# Patient Record
Sex: FEMALE | Race: WHITE | NOT HISPANIC OR LATINO | URBAN - METROPOLITAN AREA
[De-identification: names, ages, dates, MRNs, and addresses within clinical notes are randomized per-mention and may not be internally consistent; named-entity substitution may affect disease eponyms.]

---

## 2020-07-09 ENCOUNTER — OFFICE VISIT (OUTPATIENT)
Dept: URBAN - METROPOLITAN AREA CLINIC 94 | Facility: CLINIC | Age: 35
End: 2020-07-09

## 2020-07-28 ENCOUNTER — LAB OUTSIDE AN ENCOUNTER (OUTPATIENT)
Dept: URBAN - METROPOLITAN AREA CLINIC 94 | Facility: CLINIC | Age: 35
End: 2020-07-28

## 2020-07-28 ENCOUNTER — OFFICE VISIT (OUTPATIENT)
Dept: URBAN - METROPOLITAN AREA CLINIC 94 | Facility: CLINIC | Age: 35
End: 2020-07-28
Payer: COMMERCIAL

## 2020-07-28 DIAGNOSIS — R19.7 DIARRHEA: ICD-10-CM

## 2020-07-28 DIAGNOSIS — R10.9 ABDOMINAL PAIN: ICD-10-CM

## 2020-07-28 PROCEDURE — G8427 DOCREV CUR MEDS BY ELIG CLIN: HCPCS | Performed by: INTERNAL MEDICINE

## 2020-07-28 PROCEDURE — G8417 CALC BMI ABV UP PARAM F/U: HCPCS | Performed by: INTERNAL MEDICINE

## 2020-07-28 PROCEDURE — G9903 PT SCRN TBCO ID AS NON USER: HCPCS | Performed by: INTERNAL MEDICINE

## 2020-07-28 PROCEDURE — 99204 OFFICE O/P NEW MOD 45 MIN: CPT | Performed by: INTERNAL MEDICINE

## 2020-07-28 RX ORDER — CHOLESTYRAMINE 4 G/9G
1 PACKET MIXED WITH WATER OR NON-CARBONATED DRINK POWDER, FOR SUSPENSION ORAL TWICE A DAY
Qty: 60 | Refills: 6 | OUTPATIENT

## 2020-07-28 RX ORDER — DIVALPROEX SODIUM 500 MG/1
1 TABLET TABLET, DELAYED RELEASE ORAL TWICE A DAY
Status: ACTIVE | COMMUNITY

## 2020-07-28 RX ORDER — BUPROPION HYDROCHLORIDE 300 MG/1
1 TABLET IN THE MORNING TABLET, EXTENDED RELEASE ORAL ONCE A DAY
Status: ACTIVE | COMMUNITY

## 2020-07-28 RX ORDER — HYDROXYZINE PAMOATE 50 MG/1
1 CAPSULE AS NEEDED CAPSULE ORAL
Status: ACTIVE | COMMUNITY

## 2020-07-28 RX ORDER — MELOXICAM 7.5 MG/1
1 TABLET TABLET ORAL ONCE A DAY
Status: ACTIVE | COMMUNITY

## 2020-07-28 RX ORDER — CLONAZEPAM 0.5 MG/1
1 TABLET AT BEDTIME TABLET ORAL ONCE A DAY
Status: ACTIVE | COMMUNITY

## 2020-07-28 RX ORDER — CHLORZOXAZONE 500 MG/1
1 TABLET AS NEEDED TABLET ORAL THREE TIMES A DAY
Status: ACTIVE | COMMUNITY

## 2020-07-28 RX ORDER — ZIPRASIDONE HYDROCHLORIDE 60 MG/1
1 CAPSULE WITH FOOD CAPSULE ORAL TWICE A DAY
Status: ACTIVE | COMMUNITY

## 2020-07-28 RX ORDER — SIMVASTATIN 20 MG/1
1 TABLET IN THE EVENING TABLET, FILM COATED ORAL ONCE A DAY
Status: ACTIVE | COMMUNITY

## 2020-07-28 RX ORDER — METFORMIN ER 500 MG 500 MG/1
1 TABLET WITH EVENING MEAL TABLET ORAL ONCE A DAY
Status: ACTIVE | COMMUNITY

## 2020-07-28 RX ORDER — ZIPRASIDONE HYDROCHLORIDE 80 MG/1
1 CAPSULE WITH FOOD CAPSULE ORAL TWICE A DAY
Status: ACTIVE | COMMUNITY

## 2020-07-28 RX ORDER — DICYCLOMINE HYDROCHLORIDE 10 MG/1
1 TABLET CAPSULE ORAL THREE TIMES A DAY
Qty: 90 TABLET | Refills: 3 | OUTPATIENT
Start: 2020-07-28 | End: 2020-11-24

## 2020-07-28 RX ORDER — BUDESONIDE AND FORMOTEROL FUMARATE DIHYDRATE 80; 4.5 UG/1; UG/1
2 PUFFS AEROSOL RESPIRATORY (INHALATION) ONCE A DAY
Status: ACTIVE | COMMUNITY

## 2020-07-28 NOTE — HPI-TODAY'S VISIT:
Pt states that she has chronic diarrhea with abdominal pain for several years. She states that she had an EGD and COLONOSCOPY in [facility] 5-6 years ago and reportedly " everything was OK". No old records available. Pt states that she has an average of 8-10 loose BMs daily. She has lower abdominal discomfort with bloating and cramping. Denies rectal bleeding, weight loss or fever. Pt recenlty saw her PCP. CT A/P and RUQ US  showed fatty liver otherwise negative. Stool studies were negative for any infection.

## 2020-07-31 LAB
A/G RATIO: 1.9
ACCA: 50
ALBUMIN: 4.2
ALCA: 16
ALKALINE PHOSPHATASE: 62
ALT (SGPT): 58
AMCA: 34
AST (SGOT): 32
ATYPICAL PANCA: NEGATIVE
BASO (ABSOLUTE): 0.1
BASOS: 1
BILIRUBIN, TOTAL: <0.2
BUN/CREATININE RATIO: 16
BUN: 14
C-REACTIVE PROTEIN, QUANT: 2
CALCIUM: 10.1
CARBON DIOXIDE, TOTAL: 22
CHLORIDE: 102
COMMENTS: (no result)
CREATININE: 0.85
EGFR IF AFRICN AM: 102
EGFR IF NONAFRICN AM: 88
ENDOMYSIAL ANTIBODY IGA: NEGATIVE
EOS (ABSOLUTE): 0.3
EOS: 3
GASCA: 12
GLOBULIN, TOTAL: 2.2
GLUCOSE: 125
HEMATOCRIT: 45.5
HEMATOLOGY COMMENTS:: (no result)
HEMOGLOBIN: 15.4
IMMATURE CELLS: (no result)
IMMATURE GRANS (ABS): 0
IMMATURE GRANULOCYTES: 0
IMMUNOGLOBULIN A, QN, SERUM: 198
LYMPHS (ABSOLUTE): 3.9
LYMPHS: 36
MCH: 30.9
MCHC: 33.8
MCV: 91
MONOCYTES(ABSOLUTE): 0.9
MONOCYTES: 9
NEUTROPHILS (ABSOLUTE): 5.5
NEUTROPHILS: 51
NRBC: (no result)
PLATELETS: 381
POTASSIUM: 4.3
PROTEIN, TOTAL: 6.4
RBC: 4.99
RDW: 13.3
SODIUM: 141
T-TRANSGLUTAMINASE (TTG) IGA: <2
TSH: 3.12
WBC: 10.8

## 2020-08-17 LAB
CALPROTECTIN, FECAL: 30
PANCREATIC ELASTASE, FECAL: >500

## 2020-08-27 ENCOUNTER — WEB ENCOUNTER (OUTPATIENT)
Dept: URBAN - METROPOLITAN AREA CLINIC 94 | Facility: CLINIC | Age: 35
End: 2020-08-27

## 2020-08-27 ENCOUNTER — OFFICE VISIT (OUTPATIENT)
Dept: URBAN - METROPOLITAN AREA CLINIC 94 | Facility: CLINIC | Age: 35
End: 2020-08-27
Payer: COMMERCIAL

## 2020-08-27 ENCOUNTER — LAB OUTSIDE AN ENCOUNTER (OUTPATIENT)
Dept: URBAN - METROPOLITAN AREA CLINIC 94 | Facility: CLINIC | Age: 35
End: 2020-08-27

## 2020-08-27 DIAGNOSIS — K58.9 IBS (IRRITABLE BOWEL SYNDROME)-DIARRHEA: ICD-10-CM

## 2020-08-27 DIAGNOSIS — E66.9 OBESITY: ICD-10-CM

## 2020-08-27 DIAGNOSIS — K76.0 FATTY LIVER: ICD-10-CM

## 2020-08-27 DIAGNOSIS — R11.2 NAUSEA & VOMITING: ICD-10-CM

## 2020-08-27 PROCEDURE — G8427 DOCREV CUR MEDS BY ELIG CLIN: HCPCS | Performed by: INTERNAL MEDICINE

## 2020-08-27 PROCEDURE — 99214 OFFICE O/P EST MOD 30 MIN: CPT | Performed by: INTERNAL MEDICINE

## 2020-08-27 PROCEDURE — G9903 PT SCRN TBCO ID AS NON USER: HCPCS | Performed by: INTERNAL MEDICINE

## 2020-08-27 PROCEDURE — G8417 CALC BMI ABV UP PARAM F/U: HCPCS | Performed by: INTERNAL MEDICINE

## 2020-08-27 RX ORDER — ZIPRASIDONE HYDROCHLORIDE 80 MG/1
1 CAPSULE WITH FOOD CAPSULE ORAL TWICE A DAY
Status: ACTIVE | COMMUNITY

## 2020-08-27 RX ORDER — CHOLESTYRAMINE 4 G/9G
1 PACKET MIXED WITH WATER OR NON-CARBONATED DRINK POWDER, FOR SUSPENSION ORAL TWICE A DAY
Qty: 60 | Refills: 6 | Status: ACTIVE | COMMUNITY

## 2020-08-27 RX ORDER — DIVALPROEX SODIUM 500 MG/1
1 TABLET TABLET, DELAYED RELEASE ORAL TWICE A DAY
Status: ACTIVE | COMMUNITY

## 2020-08-27 RX ORDER — CLONAZEPAM 0.5 MG/1
1 TABLET AT BEDTIME TABLET ORAL ONCE A DAY
Status: ACTIVE | COMMUNITY

## 2020-08-27 RX ORDER — METFORMIN ER 500 MG 500 MG/1
1 TABLET WITH EVENING MEAL TABLET ORAL ONCE A DAY
Status: ACTIVE | COMMUNITY

## 2020-08-27 RX ORDER — DICYCLOMINE HYDROCHLORIDE 10 MG/1
1 TABLET CAPSULE ORAL THREE TIMES A DAY
Qty: 90 TABLET | Refills: 3 | Status: ACTIVE | COMMUNITY
Start: 2020-07-28 | End: 2020-11-24

## 2020-08-27 RX ORDER — CHLORZOXAZONE 500 MG/1
1 TABLET AS NEEDED TABLET ORAL THREE TIMES A DAY
Status: ACTIVE | COMMUNITY

## 2020-08-27 RX ORDER — ZIPRASIDONE HYDROCHLORIDE 60 MG/1
1 CAPSULE WITH FOOD CAPSULE ORAL TWICE A DAY
Status: ACTIVE | COMMUNITY

## 2020-08-27 RX ORDER — SIMVASTATIN 20 MG/1
1 TABLET IN THE EVENING TABLET, FILM COATED ORAL ONCE A DAY
Status: ACTIVE | COMMUNITY

## 2020-08-27 RX ORDER — MELOXICAM 7.5 MG/1
1 TABLET TABLET ORAL ONCE A DAY
Status: ACTIVE | COMMUNITY

## 2020-08-27 RX ORDER — HYDROXYZINE PAMOATE 50 MG/1
1 CAPSULE AS NEEDED CAPSULE ORAL
Status: ACTIVE | COMMUNITY

## 2020-08-27 RX ORDER — BUPROPION HYDROCHLORIDE 300 MG/1
1 TABLET IN THE MORNING TABLET, EXTENDED RELEASE ORAL ONCE A DAY
Status: ACTIVE | COMMUNITY

## 2020-08-27 RX ORDER — BUDESONIDE AND FORMOTEROL FUMARATE DIHYDRATE 80; 4.5 UG/1; UG/1
2 PUFFS AEROSOL RESPIRATORY (INHALATION) ONCE A DAY
Status: ACTIVE | COMMUNITY

## 2020-08-27 NOTE — HPI-TODAY'S VISIT:
Pt states that diarrhea has resolved after starting questran and dicyclomine. She continues to have chronic nausea. She has diabetes and morbid obesity. She states that she had EGD and COLONOSCOPY 4-5 years ago in [facility] and does not remember results. Reports are not available. Pt reportedly had RUQ US and CT A/P by her PCP which reportedly showed fatty liver. Reports are not available. Pt recently has multiple labs including CBC, CMP, TSH, CRP, celiac serologies, IBD serologies, fecal elastase and fecal calprotectin. Only abnormlaity was slightly elevated ALT at 58. Remaining labs were WNL.

## 2020-11-30 ENCOUNTER — WEB ENCOUNTER (OUTPATIENT)
Dept: URBAN - METROPOLITAN AREA CLINIC 94 | Facility: CLINIC | Age: 35
End: 2020-11-30

## 2020-11-30 ENCOUNTER — CLAIMS CREATED FROM THE CLAIM WINDOW (OUTPATIENT)
Dept: URBAN - METROPOLITAN AREA CLINIC 94 | Facility: CLINIC | Age: 35
End: 2020-11-30
Payer: COMMERCIAL

## 2020-11-30 ENCOUNTER — OFFICE VISIT (OUTPATIENT)
Dept: URBAN - METROPOLITAN AREA CLINIC 94 | Facility: CLINIC | Age: 35
End: 2020-11-30

## 2020-11-30 DIAGNOSIS — K58.9 IBS (IRRITABLE BOWEL SYNDROME)-DIARRHEA: ICD-10-CM

## 2020-11-30 DIAGNOSIS — R11.2 NAUSEA & VOMITING: ICD-10-CM

## 2020-11-30 DIAGNOSIS — K76.0 FATTY LIVER: ICD-10-CM

## 2020-11-30 DIAGNOSIS — E66.9 OBESITY: ICD-10-CM

## 2020-11-30 DIAGNOSIS — K58.8 OTHER IRRITABLE BOWEL SYNDROME: ICD-10-CM

## 2020-11-30 PROCEDURE — G8484 FLU IMMUNIZE NO ADMIN: HCPCS | Performed by: PHYSICIAN ASSISTANT

## 2020-11-30 PROCEDURE — G9903 PT SCRN TBCO ID AS NON USER: HCPCS | Performed by: PHYSICIAN ASSISTANT

## 2020-11-30 PROCEDURE — G8427 DOCREV CUR MEDS BY ELIG CLIN: HCPCS | Performed by: PHYSICIAN ASSISTANT

## 2020-11-30 PROCEDURE — G8417 CALC BMI ABV UP PARAM F/U: HCPCS | Performed by: PHYSICIAN ASSISTANT

## 2020-11-30 PROCEDURE — 99213 OFFICE O/P EST LOW 20 MIN: CPT | Performed by: PHYSICIAN ASSISTANT

## 2020-11-30 PROCEDURE — 1036F TOBACCO NON-USER: CPT | Performed by: PHYSICIAN ASSISTANT

## 2020-11-30 RX ORDER — CHLORZOXAZONE 500 MG/1
1 TABLET AS NEEDED TABLET ORAL THREE TIMES A DAY
Status: ACTIVE | COMMUNITY

## 2020-11-30 RX ORDER — BUPROPION HYDROCHLORIDE 300 MG/1
1 TABLET IN THE MORNING TABLET, EXTENDED RELEASE ORAL ONCE A DAY
Status: ACTIVE | COMMUNITY

## 2020-11-30 RX ORDER — CLONAZEPAM 0.5 MG/1
1 TABLET AT BEDTIME TABLET ORAL ONCE A DAY
Status: ACTIVE | COMMUNITY

## 2020-11-30 RX ORDER — MELOXICAM 7.5 MG/1
1 TABLET TABLET ORAL ONCE A DAY
Status: ACTIVE | COMMUNITY

## 2020-11-30 RX ORDER — ONDANSETRON HYDROCHLORIDE 4 MG/1
1 TABLET TABLET, FILM COATED ORAL ONCE A DAY
Qty: 30 | OUTPATIENT
Start: 2020-11-30

## 2020-11-30 RX ORDER — ONDANSETRON HYDROCHLORIDE 4 MG/1
1 TABLET TABLET, FILM COATED ORAL ONCE A DAY
Qty: 30 TABLET | Refills: 2
Start: 2020-11-30

## 2020-11-30 RX ORDER — SIMVASTATIN 20 MG/1
1 TABLET IN THE EVENING TABLET, FILM COATED ORAL ONCE A DAY
Status: ACTIVE | COMMUNITY

## 2020-11-30 RX ORDER — DIVALPROEX SODIUM 500 MG/1
1 TABLET TABLET, DELAYED RELEASE ORAL TWICE A DAY
Status: DISCONTINUED | COMMUNITY

## 2020-11-30 RX ORDER — METFORMIN ER 500 MG 500 MG/1
1 TABLET WITH EVENING MEAL TABLET ORAL ONCE A DAY
Status: ACTIVE | COMMUNITY

## 2020-11-30 RX ORDER — CHOLESTYRAMINE 4 G/9G
1 PACKET MIXED WITH WATER OR NON-CARBONATED DRINK POWDER, FOR SUSPENSION ORAL TWICE A DAY
Qty: 60 | Refills: 6 | Status: ACTIVE | COMMUNITY

## 2020-11-30 RX ORDER — BUDESONIDE AND FORMOTEROL FUMARATE DIHYDRATE 80; 4.5 UG/1; UG/1
2 PUFFS AEROSOL RESPIRATORY (INHALATION) ONCE A DAY
Status: ACTIVE | COMMUNITY

## 2020-11-30 RX ORDER — ZIPRASIDONE HYDROCHLORIDE 60 MG/1
1 CAPSULE WITH FOOD CAPSULE ORAL TWICE A DAY
Status: ACTIVE | COMMUNITY

## 2020-11-30 RX ORDER — CHOLESTYRAMINE 4 G/9G
1 PACKET MIXED WITH WATER OR NON-CARBONATED DRINK POWDER, FOR SUSPENSION ORAL TWICE A DAY
Qty: 60

## 2020-11-30 RX ORDER — HYDROXYZINE PAMOATE 50 MG/1
1 CAPSULE AS NEEDED CAPSULE ORAL
Status: ACTIVE | COMMUNITY

## 2020-11-30 RX ORDER — DICYCLOMINE HYDROCHLORIDE 10 MG/1
1 CAPSULES CAPSULE ORAL THREE TIMES A DAY
Qty: 270 CAPSULE | Refills: 1 | OUTPATIENT
Start: 2020-11-30 | End: 2021-05-29

## 2020-11-30 RX ORDER — CHOLESTYRAMINE 4 G/9G
1 PACKET MIXED WITH WATER OR NON-CARBONATED DRINK POWDER, FOR SUSPENSION ORAL TWICE A DAY
Qty: 180 | Refills: 1

## 2020-11-30 RX ORDER — ZIPRASIDONE HYDROCHLORIDE 80 MG/1
1 CAPSULE WITH FOOD CAPSULE ORAL TWICE A DAY
Status: ACTIVE | COMMUNITY

## 2020-11-30 RX ORDER — DICYCLOMINE HYDROCHLORIDE 10 MG/1
2 CAPSULES CAPSULE ORAL THREE TIMES A DAY
Qty: 180 | OUTPATIENT
Start: 2020-11-30 | End: 2020-12-30

## 2020-11-30 NOTE — HPI-TODAY'S VISIT:
The patient returns today and reports chronic nausea. She states that this occurs when she is hungry or if she eats a large meal. She eats two meals/day with one snack. She reports occasional vomiting. She takes Zofran ~ twice/week.  She takes Questran as needed for diarrhea and Dicyclomine prn for abd pain.   She has diabetes and morbid obesity. Gastric Emptying study was negative.  She states that she had EGD and COLONOSCOPY 4-5 years ago in [facility] and does not remember results. Reports are not available. Pt reportedly had RUQ US and CT A/P by her PCP which reportedly showed fatty liver. Reports are not available. Pt recently has multiple labs including CBC, CMP, TSH, CRP, celiac serologies, IBD serologies, fecal elastase and fecal calprotectin. Only abnormlaity was slightly elevated ALT at 58. Remaining labs were WNL.

## 2021-03-01 ENCOUNTER — OFFICE VISIT (OUTPATIENT)
Dept: URBAN - METROPOLITAN AREA CLINIC 94 | Facility: CLINIC | Age: 36
End: 2021-03-01

## 2021-03-15 ENCOUNTER — OFFICE VISIT (OUTPATIENT)
Dept: URBAN - METROPOLITAN AREA CLINIC 94 | Facility: CLINIC | Age: 36
End: 2021-03-15
Payer: COMMERCIAL

## 2021-03-15 DIAGNOSIS — K21.9 GASTROESOPHAGEAL REFLUX DISEASE, UNSPECIFIED WHETHER ESOPHAGITIS PRESENT: ICD-10-CM

## 2021-03-15 DIAGNOSIS — K58.9 IRRITABLE BOWEL SYNDROME, UNSPECIFIED TYPE: ICD-10-CM

## 2021-03-15 DIAGNOSIS — K76.0 FATTY (CHANGE OF) LIVER, NOT ELSEWHERE CLASSIFIED: ICD-10-CM

## 2021-03-15 PROCEDURE — 99213 OFFICE O/P EST LOW 20 MIN: CPT | Performed by: INTERNAL MEDICINE

## 2021-03-15 RX ORDER — ONDANSETRON HYDROCHLORIDE 4 MG/1
1 TABLET TABLET, FILM COATED ORAL ONCE A DAY
Qty: 30 | Status: ACTIVE | COMMUNITY
Start: 2020-11-30

## 2021-03-15 RX ORDER — ZIPRASIDONE HYDROCHLORIDE 60 MG/1
2 CAPSULES CAPSULE ORAL AT BEDTIME
Status: ACTIVE | COMMUNITY

## 2021-03-15 RX ORDER — CHLORZOXAZONE 500 MG/1
1 TABLET AS NEEDED TABLET ORAL THREE TIMES A DAY
Status: ACTIVE | COMMUNITY

## 2021-03-15 RX ORDER — ZIPRASIDONE HYDROCHLORIDE 80 MG/1
1 CAPSULE WITH FOOD CAPSULE ORAL DAILY
Status: ACTIVE | COMMUNITY

## 2021-03-15 RX ORDER — METFORMIN ER 500 MG 500 MG/1
1 TABLET WITH EVENING MEAL TABLET ORAL ONCE A DAY
Status: ACTIVE | COMMUNITY

## 2021-03-15 RX ORDER — DICYCLOMINE HYDROCHLORIDE 10 MG/1
1 CAPSULES CAPSULE ORAL THREE TIMES A DAY
Qty: 270 CAPSULE | Refills: 1 | Status: ACTIVE | COMMUNITY
Start: 2020-11-30 | End: 2021-05-29

## 2021-03-15 RX ORDER — CHOLESTYRAMINE 4 G/9G
1 PACKET MIXED WITH WATER OR NON-CARBONATED DRINK POWDER, FOR SUSPENSION ORAL TWICE A DAY
Qty: 60 | Status: ACTIVE | COMMUNITY

## 2021-03-15 RX ORDER — ZIPRASIDONE HYDROCHLORIDE 80 MG/1
1 CAPSULE WITH FOOD CAPSULE ORAL TWICE A DAY
Status: DISCONTINUED | COMMUNITY

## 2021-03-15 RX ORDER — TRAMADOL HYDROCHLORIDE 50 MG/1
1 TABLET AS NEEDED TABLET, FILM COATED ORAL ONCE A DAY
Status: ACTIVE | COMMUNITY

## 2021-03-15 RX ORDER — HYDROXYZINE PAMOATE 50 MG/1
1 CAPSULE AS NEEDED CAPSULE ORAL
Status: ACTIVE | COMMUNITY

## 2021-03-15 RX ORDER — BUPROPION HYDROCHLORIDE 300 MG/1
1 TABLET IN THE MORNING TABLET, EXTENDED RELEASE ORAL ONCE A DAY
Status: ACTIVE | COMMUNITY

## 2021-03-15 RX ORDER — MELOXICAM 7.5 MG/1
1 TABLET TABLET ORAL ONCE A DAY
Status: ACTIVE | COMMUNITY

## 2021-03-15 RX ORDER — SIMVASTATIN 20 MG/1
1 TABLET IN THE EVENING TABLET, FILM COATED ORAL ONCE A DAY
Status: ACTIVE | COMMUNITY

## 2021-03-15 RX ORDER — CLONAZEPAM 0.5 MG/1
1 TABLET AT BEDTIME TABLET ORAL ONCE A DAY
Status: ACTIVE | COMMUNITY

## 2021-03-15 RX ORDER — BUDESONIDE AND FORMOTEROL FUMARATE DIHYDRATE 80; 4.5 UG/1; UG/1
2 PUFFS AEROSOL RESPIRATORY (INHALATION) ONCE A DAY
Status: ACTIVE | COMMUNITY

## 2021-03-15 NOTE — HPI-TODAY'S VISIT:
The patient returns today and reports improvement in  nausea and diarrhea .   She takes Questran as needed for diarrhea and Dicyclomine prn for abd pain. She has diabetes and morbid obesity. Gastric Emptying study was negative.  She states that she had EGD and COLONOSCOPY 4-5 years ago in [facility] and does not remember results. Reports are not available. Pt reportedly had RUQ US and CT A/P by her PCP which reportedly showed fatty liver. Reports are not available. Pt recently has multiple labs including CBC, CMP, TSH, CRP, celiac serologies, IBD serologies, fecal elastase and fecal calprotectin. Only abnormlaity was slightly elevated ALT at 58. Remaining labs were WNL.

## 2021-05-20 ENCOUNTER — OFFICE VISIT (OUTPATIENT)
Dept: URBAN - METROPOLITAN AREA CLINIC 40 | Facility: CLINIC | Age: 36
End: 2021-05-20
Payer: COMMERCIAL

## 2021-05-20 VITALS
BODY MASS INDEX: 48.82 KG/M2 | HEART RATE: 101 BPM | TEMPERATURE: 97.5 F | HEIGHT: 65 IN | WEIGHT: 293 LBS | DIASTOLIC BLOOD PRESSURE: 82 MMHG | SYSTOLIC BLOOD PRESSURE: 140 MMHG

## 2021-05-20 DIAGNOSIS — K76.0 FATTY (CHANGE OF) LIVER, NOT ELSEWHERE CLASSIFIED: ICD-10-CM

## 2021-05-20 DIAGNOSIS — E66.9 OBESITY (BMI 30-39.9): ICD-10-CM

## 2021-05-20 DIAGNOSIS — K21.9 GERD: ICD-10-CM

## 2021-05-20 DIAGNOSIS — D72.829 LEUKOCYTOSIS: ICD-10-CM

## 2021-05-20 DIAGNOSIS — R79.89 ELEVATED LFTS: ICD-10-CM

## 2021-05-20 DIAGNOSIS — K58.9 IBS (IRRITABLE BOWEL SYNDROME) DIARRHEA PREDOMINANT: ICD-10-CM

## 2021-05-20 PROBLEM — 10743008: Status: ACTIVE | Noted: 2021-03-15

## 2021-05-20 PROCEDURE — 99213 OFFICE O/P EST LOW 20 MIN: CPT | Performed by: INTERNAL MEDICINE

## 2021-05-20 RX ORDER — ZIPRASIDONE HYDROCHLORIDE 60 MG/1
2 CAPSULES CAPSULE ORAL AT BEDTIME
Status: ACTIVE | COMMUNITY

## 2021-05-20 RX ORDER — HYDROXYZINE PAMOATE 50 MG/1
1 CAPSULE AS NEEDED CAPSULE ORAL
Status: ACTIVE | COMMUNITY

## 2021-05-20 RX ORDER — BUPROPION HYDROCHLORIDE 300 MG/1
1 TABLET IN THE MORNING TABLET, EXTENDED RELEASE ORAL ONCE A DAY
Status: ACTIVE | COMMUNITY

## 2021-05-20 RX ORDER — ONDANSETRON HYDROCHLORIDE 4 MG/1
1 TABLET TABLET, FILM COATED ORAL ONCE A DAY
Qty: 30 | Status: ACTIVE | COMMUNITY
Start: 2020-11-30

## 2021-05-20 RX ORDER — TRAMADOL HYDROCHLORIDE 50 MG/1
1 TABLET AS NEEDED TABLET, FILM COATED ORAL ONCE A DAY
Status: ACTIVE | COMMUNITY

## 2021-05-20 RX ORDER — CLONAZEPAM 0.5 MG/1
1 TABLET AT BEDTIME TABLET ORAL ONCE A DAY
Status: ACTIVE | COMMUNITY

## 2021-05-20 RX ORDER — METFORMIN ER 500 MG 500 MG/1
1 TABLET WITH EVENING MEAL TABLET ORAL ONCE A DAY
Status: ACTIVE | COMMUNITY

## 2021-05-20 RX ORDER — CHLORZOXAZONE 500 MG/1
1 TABLET AS NEEDED TABLET ORAL THREE TIMES A DAY
Status: ACTIVE | COMMUNITY

## 2021-05-20 RX ORDER — EMPAGLIFLOZIN 25 MG/1
1 TABLET TABLET, FILM COATED ORAL ONCE A DAY
Status: ACTIVE | COMMUNITY

## 2021-05-20 RX ORDER — DICYCLOMINE HYDROCHLORIDE 10 MG/1
1 CAPSULES CAPSULE ORAL THREE TIMES A DAY
Qty: 270 CAPSULE | Refills: 1 | Status: ACTIVE | COMMUNITY
Start: 2020-11-30 | End: 2021-05-29

## 2021-05-20 RX ORDER — ZIPRASIDONE HYDROCHLORIDE 80 MG/1
1 CAPSULE WITH FOOD CAPSULE ORAL DAILY
Status: ACTIVE | COMMUNITY

## 2021-05-20 RX ORDER — BUDESONIDE AND FORMOTEROL FUMARATE DIHYDRATE 80; 4.5 UG/1; UG/1
2 PUFFS AEROSOL RESPIRATORY (INHALATION) ONCE A DAY
Status: ACTIVE | COMMUNITY

## 2021-05-20 RX ORDER — CHOLESTYRAMINE 4 G/9G
1 PACKET MIXED WITH WATER OR NON-CARBONATED DRINK POWDER, FOR SUSPENSION ORAL TWICE A DAY
Qty: 60 | Status: ACTIVE | COMMUNITY

## 2021-05-20 RX ORDER — SIMVASTATIN 20 MG/1
1 TABLET IN THE EVENING TABLET, FILM COATED ORAL ONCE A DAY
Status: ACTIVE | COMMUNITY

## 2021-05-20 RX ORDER — LOSARTAN POTASSIUM 25 MG/1
1 TABLET TABLET ORAL ONCE A DAY
Status: ACTIVE | COMMUNITY

## 2021-05-20 RX ORDER — MELOXICAM 7.5 MG/1
1 TABLET TABLET ORAL ONCE A DAY
Status: ACTIVE | COMMUNITY

## 2021-05-20 NOTE — PHYSICAL EXAM CONSTITUTIONAL:
well developed, morbidly obese , in no acute distress , ambulating without difficulty , normal communication ability

## 2021-05-20 NOTE — HPI-TODAY'S VISIT:
Ms. Irving is a 35 year old White female who presents to [place] office for evaluation of abnormal liver tests. Last seen by Dr. Thapa 3/15/21 for f/u of nausea and diarrhea .   She takes Questran as needed for diarrhea and Dicyclomine prn for abd pain. She has diabetes and morbid obesity. Gastric Emptying study was negative.  She states that she had EGD and COLONOSCOPY 4-5 years ago in [facility] and does not remember results. Reports are not available. Pt reportedly had RUQ US and CT A/P by her PCP which reportedly showed fatty liver. Reports are not available. Pt recently has multiple labs including CBC, CMP, TSH, CRP, celiac serologies, IBD serologies, fecal elastase and fecal calprotectin. Only abnormality was slightly elevated ALT at 58. Remaining labs were WNL. Chronic pain, anxiety. Diabetic. Has HLD.  Given both Bentyl and colestipol for IBS-D. She did have a right upper quadrant ultrasound on May 4, 2021 for evaluation of right upper quadrant abdominal pain.  The exam was normal.  Gallbladder was normal without stones or wall thickness.  Patient was not tender over the region of the gallbladder.  Once again, findings of fatty liver.  No focal lesion identified.  Right kidney normal as well as common bile duct and visualized portions of the pancreas though limited due to her body habitus.  According to blood work from April 27, 2021, AST 37 and ALT decreased from prior labs in December down to 54 from 75.  Her alkaline phosphatase and total bilirubin have remained normal.  Triglycerides elevated at 391 with normal total cholesterol and LDL.  Around that time, WBC elevated 12.59 with hemoglobin hematocrit elevated and platelet count of 451, likely acute phase reactant.  Hemoglobin A1c elevated at 7.2%.  Hepatitis C antibody negative. Since been diagnosed as diabetic, she has completely revamped her diet and is eating low-carb, no refined sugars or starches.  Drinking significant amounts of water.  Low physical activity due to recent back surgery.  Denies nausea, vomiting or dysphagia.  Her episodes of nausea have improved greatly.  She does follow-up with a pain specialist for pain control.  Believes that due to her change in her diet her IBS symptoms have also improved, not requiring daily colestipol nor Bentyl.  She is using a diabetic educator and believes this is helping her significantly, reporting a 50 pound weight loss in the last 4 months.  According to our scale, since her last visit in March, she has lost 18 pounds.  Good appetite.  No new GI complaints.

## 2021-09-12 LAB
HBSAG SCREEN: NEGATIVE
HEP A AB, IGM: NEGATIVE
HEP A AB, TOTAL: NEGATIVE
HEP B CORE AB, IGM: NEGATIVE
HEP B CORE AB, TOT: NEGATIVE
HEP B SURFACE AB, QUAL: NON REACTIVE
HEP BE AB: NEGATIVE
HEP BE AG: NEGATIVE

## 2021-09-15 ENCOUNTER — OFFICE VISIT (OUTPATIENT)
Dept: URBAN - METROPOLITAN AREA CLINIC 40 | Facility: CLINIC | Age: 36
End: 2021-09-15
Payer: COMMERCIAL

## 2021-09-15 ENCOUNTER — WEB ENCOUNTER (OUTPATIENT)
Dept: URBAN - METROPOLITAN AREA CLINIC 40 | Facility: CLINIC | Age: 36
End: 2021-09-15

## 2021-09-15 VITALS
TEMPERATURE: 97.6 F | HEART RATE: 84 BPM | DIASTOLIC BLOOD PRESSURE: 74 MMHG | HEIGHT: 65 IN | WEIGHT: 293 LBS | BODY MASS INDEX: 48.82 KG/M2 | SYSTOLIC BLOOD PRESSURE: 126 MMHG

## 2021-09-15 DIAGNOSIS — K58.9 IBS (IRRITABLE BOWEL SYNDROME) DIARRHEA PREDOMINANT: ICD-10-CM

## 2021-09-15 DIAGNOSIS — E66.9 OBESITY (BMI 30-39.9): ICD-10-CM

## 2021-09-15 DIAGNOSIS — R79.89 ELEVATED LFTS: ICD-10-CM

## 2021-09-15 DIAGNOSIS — K76.0 FATTY (CHANGE OF) LIVER, NOT ELSEWHERE CLASSIFIED: ICD-10-CM

## 2021-09-15 DIAGNOSIS — K21.9 GERD: ICD-10-CM

## 2021-09-15 DIAGNOSIS — D72.829 LEUKOCYTOSIS: ICD-10-CM

## 2021-09-15 PROCEDURE — 99213 OFFICE O/P EST LOW 20 MIN: CPT | Performed by: PHYSICIAN ASSISTANT

## 2021-09-15 RX ORDER — HYDROXYZINE PAMOATE 50 MG/1
1 CAPSULE AS NEEDED CAPSULE ORAL
Status: ACTIVE | COMMUNITY

## 2021-09-15 RX ORDER — CHLORZOXAZONE 500 MG/1
1 TABLET AS NEEDED TABLET ORAL THREE TIMES A DAY
Status: ACTIVE | COMMUNITY

## 2021-09-15 RX ORDER — SIMVASTATIN 20 MG/1
1 TABLET IN THE EVENING TABLET, FILM COATED ORAL ONCE A DAY
Status: ACTIVE | COMMUNITY

## 2021-09-15 RX ORDER — TRAMADOL HYDROCHLORIDE 50 MG/1
1 TABLET AS NEEDED TABLET, FILM COATED ORAL ONCE A DAY
Status: ACTIVE | COMMUNITY

## 2021-09-15 RX ORDER — EMPAGLIFLOZIN 25 MG/1
1 TABLET TABLET, FILM COATED ORAL ONCE A DAY
Status: ACTIVE | COMMUNITY

## 2021-09-15 RX ORDER — LOSARTAN POTASSIUM 25 MG/1
1 TABLET TABLET ORAL ONCE A DAY
Status: ACTIVE | COMMUNITY

## 2021-09-15 RX ORDER — CHOLESTYRAMINE 4 G/9G
1 PACKET MIXED WITH WATER OR NON-CARBONATED DRINK POWDER, FOR SUSPENSION ORAL TWICE A DAY
Qty: 60 | Status: ACTIVE | COMMUNITY

## 2021-09-15 RX ORDER — MELOXICAM 7.5 MG/1
1 TABLET TABLET ORAL ONCE A DAY
Status: ACTIVE | COMMUNITY

## 2021-09-15 RX ORDER — CLONAZEPAM 0.5 MG/1
1 TABLET AT BEDTIME TABLET ORAL ONCE A DAY
Status: ACTIVE | COMMUNITY

## 2021-09-15 RX ORDER — BUPROPION HYDROCHLORIDE 300 MG/1
1 TABLET IN THE MORNING TABLET, EXTENDED RELEASE ORAL ONCE A DAY
Status: ACTIVE | COMMUNITY

## 2021-09-15 RX ORDER — ZIPRASIDONE HYDROCHLORIDE 80 MG/1
1 CAPSULE WITH FOOD CAPSULE ORAL DAILY
Status: ACTIVE | COMMUNITY

## 2021-09-15 RX ORDER — ZIPRASIDONE HYDROCHLORIDE 60 MG/1
2 CAPSULES CAPSULE ORAL AT BEDTIME
Status: ACTIVE | COMMUNITY

## 2021-09-15 RX ORDER — ONDANSETRON HYDROCHLORIDE 4 MG/1
1 TABLET TABLET, FILM COATED ORAL ONCE A DAY
Qty: 30 | Status: ACTIVE | COMMUNITY
Start: 2020-11-30

## 2021-09-15 RX ORDER — TRAZODONE HYDROCHLORIDE 100 MG/1
1 TABLET AT BEDTIME TABLET, FILM COATED ORAL ONCE A DAY
Status: ACTIVE | COMMUNITY

## 2021-09-15 RX ORDER — METFORMIN ER 500 MG 500 MG/1
1 TABLET WITH EVENING MEAL TABLET ORAL ONCE A DAY
Status: ACTIVE | COMMUNITY

## 2021-09-15 RX ORDER — BUDESONIDE AND FORMOTEROL FUMARATE DIHYDRATE 80; 4.5 UG/1; UG/1
2 PUFFS AEROSOL RESPIRATORY (INHALATION) ONCE A DAY
Status: ACTIVE | COMMUNITY

## 2021-09-15 NOTE — HPI-TODAY'S VISIT:
Ms. Irving is a 36 year old White female who presents to [place] office for evaluation of abnormal liver tests, last visit 5/21/21. Previously seen by Dr. Thapa 3/15/21 for f/u of nausea and diarrhea .   She takes Questran as needed for diarrhea and Dicyclomine prn for abd pain. She has diabetes and morbid obesity. Gastric Emptying study was negative.  She states that she had EGD and COLONOSCOPY 4-5 years ago in [facility] and does not remember results. Reports are not available. Pt reportedly had RUQ US and CT A/P by her PCP which reportedly showed fatty liver. Reports are not available. Pt recently has multiple labs including CBC, CMP, TSH, CRP, celiac serologies, IBD serologies, fecal elastase and fecal calprotectin. Only abnormality was slightly elevated ALT at 58. Remaining labs were WNL. Chronic pain, anxiety. Diabetic. Has HLD.  Given both Bentyl and colestipol for IBS-D. She did have a right upper quadrant ultrasound on May 4, 2021 for evaluation of right upper quadrant abdominal pain.  The exam was normal.  Gallbladder was normal without stones or wall thickness.  Patient was not tender over the region of the gallbladder.  Once again, findings of fatty liver.  No focal lesion identified.  Right kidney normal as well as common bile duct and visualized portions of the pancreas though limited due to her body habitus.  According to blood work from April 27, 2021, AST 37 and ALT decreased from prior labs in December down to 54 from 75.  Her alkaline phosphatase and total bilirubin have remained normal.  Triglycerides elevated at 391 with normal total cholesterol and LDL.  Around that time, WBC elevated 12.59 with hemoglobin hematocrit elevated and platelet count of 451, likely acute phase reactant.  Hemoglobin A1c elevated at 7.2%.  Hepatitis C antibody negative. Since been diagnosed as diabetic, she has completely revamped her diet and is eating low-carb, no refined sugars or starches.  Drinking significant amounts of water.  Low physical activity due to recent back surgery.  Denies nausea, vomiting or dysphagia.  Her episodes of nausea have improved greatly.  She does follow-up with a pain specialist for pain control.  Believes that due to her change in her diet her IBS symptoms have also improved, not requiring neither daily colestipol nor Bentyl.  She is using a diabetic educator and believes this is helping her significantly, reporting a 50 pound weight loss in the last 4 months.  Good appetite.  No new GI complaints. Recent lab testing with negative HAV and HBV. Down >45 pounds since last visit.

## 2021-09-16 LAB
ALBUMIN: 4.3
ALKALINE PHOSPHATASE: 62
ALT (SGPT): 38
AST (SGOT): 26
BILIRUBIN, DIRECT: 0.11
BILIRUBIN, TOTAL: 0.3
PROTEIN, TOTAL: 6.6

## 2021-09-23 ENCOUNTER — OFFICE VISIT (OUTPATIENT)
Dept: URBAN - METROPOLITAN AREA CLINIC 40 | Facility: CLINIC | Age: 36
End: 2021-09-23

## 2022-01-11 ENCOUNTER — OFFICE VISIT (OUTPATIENT)
Dept: URBAN - METROPOLITAN AREA CLINIC 40 | Facility: CLINIC | Age: 37
End: 2022-01-11
Payer: COMMERCIAL

## 2022-01-11 VITALS — BODY MASS INDEX: 47.65 KG/M2 | HEIGHT: 65 IN | WEIGHT: 286 LBS

## 2022-01-11 DIAGNOSIS — K21.9 GERD: ICD-10-CM

## 2022-01-11 DIAGNOSIS — R79.89 ELEVATED LFTS: ICD-10-CM

## 2022-01-11 DIAGNOSIS — E66.9 OBESITY (BMI 30-39.9): ICD-10-CM

## 2022-01-11 DIAGNOSIS — R11.2 NAUSEA & VOMITING: ICD-10-CM

## 2022-01-11 DIAGNOSIS — K58.9 IBS (IRRITABLE BOWEL SYNDROME) DIARRHEA PREDOMINANT: ICD-10-CM

## 2022-01-11 DIAGNOSIS — R10.9 ABDOMINAL CRAMPING: ICD-10-CM

## 2022-01-11 DIAGNOSIS — K76.0 FATTY (CHANGE OF) LIVER, NOT ELSEWHERE CLASSIFIED: ICD-10-CM

## 2022-01-11 PROBLEM — 111583006 LEUKOCYTOSIS: Status: ACTIVE | Noted: 2021-05-20

## 2022-01-11 PROCEDURE — 99213 OFFICE O/P EST LOW 20 MIN: CPT | Performed by: PHYSICIAN ASSISTANT

## 2022-01-11 RX ORDER — ONDANSETRON HYDROCHLORIDE 4 MG/1
1 TABLET TABLET, FILM COATED ORAL ONCE A DAY
Qty: 30 | Status: ACTIVE | COMMUNITY
Start: 2020-11-30

## 2022-01-11 RX ORDER — DICYCLOMINE HYDROCHLORIDE 10 MG/1
1 TABLET CAPSULE ORAL THREE TIMES A DAY
Qty: 90 TABLET | Refills: 1 | OUTPATIENT
Start: 2022-01-11 | End: 2022-03-12

## 2022-01-11 RX ORDER — EMPAGLIFLOZIN 25 MG/1
1 TABLET TABLET, FILM COATED ORAL ONCE A DAY
Status: ACTIVE | COMMUNITY

## 2022-01-11 RX ORDER — ONDANSETRON HYDROCHLORIDE 4 MG/1
1 TABLET TABLET, FILM COATED ORAL
Qty: 30 | Refills: 0

## 2022-01-11 RX ORDER — GABAPENTIN 300 MG/1
1 CAPSULE CAPSULE ORAL ONCE A DAY
Status: ACTIVE | COMMUNITY

## 2022-01-11 RX ORDER — MELOXICAM 7.5 MG/1
1 TABLET TABLET ORAL ONCE A DAY
Status: ACTIVE | COMMUNITY

## 2022-01-11 RX ORDER — BUPROPION HYDROCHLORIDE 300 MG/1
1 TABLET IN THE MORNING TABLET, EXTENDED RELEASE ORAL ONCE A DAY
Status: ACTIVE | COMMUNITY

## 2022-01-11 RX ORDER — TRAMADOL HYDROCHLORIDE 50 MG/1
1 TABLET AS NEEDED TABLET, FILM COATED ORAL ONCE A DAY
Status: ACTIVE | COMMUNITY

## 2022-01-11 RX ORDER — HYDROXYZINE PAMOATE 50 MG/1
1 CAPSULE AS NEEDED CAPSULE ORAL
Status: ACTIVE | COMMUNITY

## 2022-01-11 RX ORDER — METFORMIN ER 500 MG 500 MG/1
1 TABLET WITH EVENING MEAL TABLET ORAL ONCE A DAY
Status: ACTIVE | COMMUNITY

## 2022-01-11 RX ORDER — TRAZODONE HYDROCHLORIDE 150 MG/1
1 TABLET AT BEDTIME TABLET ORAL ONCE A DAY
Status: ACTIVE | COMMUNITY

## 2022-01-11 RX ORDER — ZIPRASIDONE HYDROCHLORIDE 60 MG/1
2 CAPSULES CAPSULE ORAL AT BEDTIME
Status: ACTIVE | COMMUNITY

## 2022-01-11 RX ORDER — SIMVASTATIN 20 MG/1
1 TABLET IN THE EVENING TABLET, FILM COATED ORAL ONCE A DAY
Status: ACTIVE | COMMUNITY

## 2022-01-11 RX ORDER — ZIPRASIDONE HYDROCHLORIDE 80 MG/1
1 CAPSULE WITH FOOD CAPSULE ORAL DAILY
Status: ACTIVE | COMMUNITY

## 2022-01-11 RX ORDER — CHOLESTYRAMINE 4 G/9G
1 PACKET MIXED WITH WATER OR NON-CARBONATED DRINK POWDER, FOR SUSPENSION ORAL TWICE A DAY
Qty: 60 | Status: ACTIVE | COMMUNITY

## 2022-01-11 RX ORDER — ZIPRASIDONE HYDROCHLORIDE 20 MG/1
1 CAPSULE WITH FOOD CAPSULE ORAL TWICE A DAY
Status: ACTIVE | COMMUNITY

## 2022-01-11 RX ORDER — LOSARTAN POTASSIUM 25 MG/1
1 TABLET TABLET ORAL ONCE A DAY
Status: ACTIVE | COMMUNITY

## 2022-01-11 RX ORDER — CLONAZEPAM 0.5 MG/1
1 TABLET AT BEDTIME TABLET ORAL ONCE A DAY
Status: ACTIVE | COMMUNITY

## 2022-01-11 RX ORDER — CHLORZOXAZONE 500 MG/1
1 TABLET AS NEEDED TABLET ORAL THREE TIMES A DAY
Status: ACTIVE | COMMUNITY

## 2022-01-11 RX ORDER — BUDESONIDE AND FORMOTEROL FUMARATE DIHYDRATE 80; 4.5 UG/1; UG/1
2 PUFFS AEROSOL RESPIRATORY (INHALATION) ONCE A DAY
Status: ACTIVE | COMMUNITY

## 2022-01-11 NOTE — HPI-TODAY'S VISIT:
Ms. Irving is a 36 year old White female who presents to [place] office for evaluation of abnormal liver tests, last visit 9/15/21. Previously seen May 2021 and also by Dr. Thapa 3/15/21 for f/u of nausea and diarrhea .   She takes Questran as needed for diarrhea and Dicyclomine prn for abd pain. She has diabetes and morbid obesity. Gastric Emptying study was negative.  She states that she had EGD and COLONOSCOPY 4-5 years ago in [facility] and does not remember results. Reports are not available. Pt reportedly had RUQ US and CT A/P by her PCP which reportedly showed fatty liver. Reports are not available. Pt recently has multiple labs including CBC, CMP, TSH, CRP, celiac serologies, IBD serologies, fecal elastase and fecal calprotectin. Only abnormality was slightly elevated ALT at 58. Remaining labs were WNL. Chronic pain, anxiety. Diabetic. Has HLD.  Given both Bentyl and colestipol for IBS-D. She did have a right upper quadrant ultrasound on May 4, 2021 for evaluation of right upper quadrant abdominal pain.  The exam was normal.  Gallbladder was normal without stones or wall thickness.  Patient was not tender over the region of the gallbladder.  Once again, findings of fatty liver.  No focal lesion identified.  Right kidney normal as well as common bile duct and visualized portions of the pancreas though limited due to her body habitus.  According to blood work from April 27, 2021, AST 37 and ALT decreased from prior labs in December down to 54 from 75.  Her alkaline phosphatase and total bilirubin have remained normal.  Triglycerides elevated at 391 with normal total cholesterol and LDL.  Around that time, WBC elevated 12.59 with hemoglobin hematocrit elevated and platelet count of 451, likely acute phase reactant.  Hemoglobin A1c elevated at 7.2%.  Hepatitis C antibody negative. Since been diagnosed as diabetic, she has completely revamped her diet and is eating low-carb, no refined sugars or starches.  Drinking significant amounts of water.  Low physical activity due to recent back surgery.  Denies nausea, vomiting or dysphagia.  Her episodes of nausea have improved greatly.  She does follow-up with a pain specialist for pain control.  Believes that due to her change in her diet her IBS symptoms have also improved, not requiring neither daily colestipol nor Bentyl.  She is using a diabetic educator and believes this is helping her significantly, reporting a 50 pound weight loss in the last 4 months.  Good appetite.  No new GI complaints. Recent lab testing with negative HAV and HBV. Labs from September with further decline of ALT, down to 38. Bloodwork this year with improved HbA1c and ALT and AST, ALK Phos and Tbili within normal limits. She continues to lose weight. She has lost 25 pounds in the last 4-5 months.

## 2022-03-04 ENCOUNTER — OFFICE VISIT (OUTPATIENT)
Dept: URBAN - METROPOLITAN AREA CLINIC 40 | Facility: CLINIC | Age: 37
End: 2022-03-04

## 2022-03-04 RX ORDER — ZIPRASIDONE HYDROCHLORIDE 60 MG/1
2 CAPSULES CAPSULE ORAL AT BEDTIME
Status: ACTIVE | COMMUNITY

## 2022-03-04 RX ORDER — ONDANSETRON HYDROCHLORIDE 4 MG/1
1 TABLET TABLET, FILM COATED ORAL
Qty: 30 | Refills: 0 | Status: ACTIVE | COMMUNITY

## 2022-03-04 RX ORDER — CHLORZOXAZONE 500 MG/1
1 TABLET AS NEEDED TABLET ORAL THREE TIMES A DAY
Status: ACTIVE | COMMUNITY

## 2022-03-04 RX ORDER — METFORMIN ER 500 MG 500 MG/1
1 TABLET WITH EVENING MEAL TABLET ORAL ONCE A DAY
Status: ACTIVE | COMMUNITY

## 2022-03-04 RX ORDER — SIMVASTATIN 20 MG/1
1 TABLET IN THE EVENING TABLET, FILM COATED ORAL ONCE A DAY
Status: ACTIVE | COMMUNITY

## 2022-03-04 RX ORDER — MELOXICAM 7.5 MG/1
1 TABLET TABLET ORAL ONCE A DAY
Status: ACTIVE | COMMUNITY

## 2022-03-04 RX ORDER — TRAMADOL HYDROCHLORIDE 50 MG/1
1 TABLET AS NEEDED TABLET, FILM COATED ORAL ONCE A DAY
Status: ACTIVE | COMMUNITY

## 2022-03-04 RX ORDER — DICYCLOMINE HYDROCHLORIDE 10 MG/1
1 TABLET CAPSULE ORAL THREE TIMES A DAY
Qty: 90 TABLET | Refills: 1 | Status: ACTIVE | COMMUNITY
Start: 2022-01-11 | End: 2022-03-12

## 2022-03-04 RX ORDER — LOSARTAN POTASSIUM 25 MG/1
1 TABLET TABLET ORAL ONCE A DAY
Status: ACTIVE | COMMUNITY

## 2022-03-04 RX ORDER — ZIPRASIDONE HYDROCHLORIDE 20 MG/1
1 CAPSULE WITH FOOD CAPSULE ORAL TWICE A DAY
Status: ACTIVE | COMMUNITY

## 2022-03-04 RX ORDER — CHOLESTYRAMINE 4 G/9G
1 PACKET MIXED WITH WATER OR NON-CARBONATED DRINK POWDER, FOR SUSPENSION ORAL TWICE A DAY
Qty: 60 | Status: ACTIVE | COMMUNITY

## 2022-03-04 RX ORDER — GABAPENTIN 300 MG/1
1 CAPSULE CAPSULE ORAL ONCE A DAY
Status: ACTIVE | COMMUNITY

## 2022-03-04 RX ORDER — ZIPRASIDONE HYDROCHLORIDE 80 MG/1
1 CAPSULE WITH FOOD CAPSULE ORAL DAILY
Status: ACTIVE | COMMUNITY

## 2022-03-04 RX ORDER — BUDESONIDE AND FORMOTEROL FUMARATE DIHYDRATE 80; 4.5 UG/1; UG/1
2 PUFFS AEROSOL RESPIRATORY (INHALATION) ONCE A DAY
Status: ACTIVE | COMMUNITY

## 2022-03-04 RX ORDER — BUPROPION HYDROCHLORIDE 300 MG/1
1 TABLET IN THE MORNING TABLET, EXTENDED RELEASE ORAL ONCE A DAY
Status: ACTIVE | COMMUNITY

## 2022-03-04 RX ORDER — EMPAGLIFLOZIN 25 MG/1
1 TABLET TABLET, FILM COATED ORAL ONCE A DAY
Status: ACTIVE | COMMUNITY

## 2022-03-04 RX ORDER — HYDROXYZINE PAMOATE 50 MG/1
1 CAPSULE AS NEEDED CAPSULE ORAL
Status: ACTIVE | COMMUNITY

## 2022-03-04 RX ORDER — CLONAZEPAM 0.5 MG/1
1 TABLET AT BEDTIME TABLET ORAL ONCE A DAY
Status: ACTIVE | COMMUNITY

## 2022-03-04 RX ORDER — TRAZODONE HYDROCHLORIDE 150 MG/1
1 TABLET AT BEDTIME TABLET ORAL ONCE A DAY
Status: ACTIVE | COMMUNITY

## 2022-03-29 ENCOUNTER — OFFICE VISIT (OUTPATIENT)
Dept: URBAN - METROPOLITAN AREA CLINIC 40 | Facility: CLINIC | Age: 37
End: 2022-03-29
Payer: COMMERCIAL

## 2022-03-29 VITALS
WEIGHT: 281 LBS | BODY MASS INDEX: 46.82 KG/M2 | SYSTOLIC BLOOD PRESSURE: 130 MMHG | HEIGHT: 65 IN | HEART RATE: 85 BPM | DIASTOLIC BLOOD PRESSURE: 78 MMHG | TEMPERATURE: 98.6 F

## 2022-03-29 DIAGNOSIS — K76.0 FATTY (CHANGE OF) LIVER, NOT ELSEWHERE CLASSIFIED: ICD-10-CM

## 2022-03-29 DIAGNOSIS — R79.89 ELEVATED LFTS: ICD-10-CM

## 2022-03-29 DIAGNOSIS — R11.2 NAUSEA & VOMITING: ICD-10-CM

## 2022-03-29 DIAGNOSIS — K58.9 IBS (IRRITABLE BOWEL SYNDROME) DIARRHEA PREDOMINANT: ICD-10-CM

## 2022-03-29 DIAGNOSIS — K21.9 GERD: ICD-10-CM

## 2022-03-29 DIAGNOSIS — E66.9 OBESITY (BMI 30-39.9): ICD-10-CM

## 2022-03-29 DIAGNOSIS — R10.9 ABDOMINAL CRAMPING: ICD-10-CM

## 2022-03-29 PROCEDURE — 99213 OFFICE O/P EST LOW 20 MIN: CPT | Performed by: PHYSICIAN ASSISTANT

## 2022-03-29 RX ORDER — ONDANSETRON HYDROCHLORIDE 4 MG/1
1 TABLET TABLET, FILM COATED ORAL
Qty: 30 | Refills: 0 | Status: ACTIVE | COMMUNITY

## 2022-03-29 RX ORDER — CHOLESTYRAMINE 4 G/9G
1 PACKET MIXED WITH WATER OR NON-CARBONATED DRINK POWDER, FOR SUSPENSION ORAL TWICE A DAY
Qty: 60 | Status: ACTIVE | COMMUNITY

## 2022-03-29 RX ORDER — ZIPRASIDONE HYDROCHLORIDE 80 MG/1
1 CAPSULE WITH FOOD CAPSULE ORAL DAILY
Status: ACTIVE | COMMUNITY

## 2022-03-29 RX ORDER — MELOXICAM 7.5 MG/1
1 TABLET TABLET ORAL ONCE A DAY
Status: ACTIVE | COMMUNITY

## 2022-03-29 RX ORDER — ZIPRASIDONE HYDROCHLORIDE 60 MG/1
2 CAPSULES CAPSULE ORAL AT BEDTIME
Status: ACTIVE | COMMUNITY

## 2022-03-29 RX ORDER — SIMVASTATIN 20 MG/1
1 TABLET IN THE EVENING TABLET, FILM COATED ORAL ONCE A DAY
Status: ACTIVE | COMMUNITY

## 2022-03-29 RX ORDER — CHLORZOXAZONE 500 MG/1
1 TABLET AS NEEDED TABLET ORAL THREE TIMES A DAY
Status: ACTIVE | COMMUNITY

## 2022-03-29 RX ORDER — METFORMIN ER 500 MG 500 MG/1
1 TABLET WITH EVENING MEAL TABLET ORAL ONCE A DAY
Status: ACTIVE | COMMUNITY

## 2022-03-29 RX ORDER — LOSARTAN POTASSIUM 25 MG/1
1 TABLET TABLET ORAL ONCE A DAY
Status: ACTIVE | COMMUNITY

## 2022-03-29 RX ORDER — GABAPENTIN 300 MG/1
1 CAPSULE CAPSULE ORAL ONCE A DAY
Status: ACTIVE | COMMUNITY

## 2022-03-29 RX ORDER — CLONAZEPAM 0.5 MG/1
1 TABLET AT BEDTIME TABLET ORAL ONCE A DAY
Status: ACTIVE | COMMUNITY

## 2022-03-29 RX ORDER — BUDESONIDE AND FORMOTEROL FUMARATE DIHYDRATE 80; 4.5 UG/1; UG/1
2 PUFFS AEROSOL RESPIRATORY (INHALATION) ONCE A DAY
Status: ACTIVE | COMMUNITY

## 2022-03-29 RX ORDER — EMPAGLIFLOZIN 25 MG/1
1 TABLET TABLET, FILM COATED ORAL ONCE A DAY
Status: ACTIVE | COMMUNITY

## 2022-03-29 RX ORDER — TRAMADOL HYDROCHLORIDE 50 MG/1
1 TABLET AS NEEDED TABLET, FILM COATED ORAL ONCE A DAY
Status: ACTIVE | COMMUNITY

## 2022-03-29 RX ORDER — ZIPRASIDONE HYDROCHLORIDE 20 MG/1
1 CAPSULE WITH FOOD CAPSULE ORAL TWICE A DAY
Status: ACTIVE | COMMUNITY

## 2022-03-29 RX ORDER — HYDROXYZINE PAMOATE 50 MG/1
1 CAPSULE AS NEEDED CAPSULE ORAL
Status: ACTIVE | COMMUNITY

## 2022-03-29 RX ORDER — BUPROPION HYDROCHLORIDE 300 MG/1
1 TABLET IN THE MORNING TABLET, EXTENDED RELEASE ORAL ONCE A DAY
Status: ACTIVE | COMMUNITY

## 2022-03-29 NOTE — HPI-TODAY'S VISIT:
Ms. Irving is a 36 year old White female who presents to [place] office for evaluation of abnormal liver tests, last visit 1/11/22. Previously seen May 2021 and also by Dr. Thapa 3/15/21 for f/u of nausea and diarrhea .   She has diabetes and morbid obesity. Gastric Emptying study was negative.  She states that she had EGD and colonoscopy 4-5 years ago in [facility] and does not remember results. Reports are not available. Pt reportedly had RUQ US and CT A/P by her PCP which reportedly showed fatty liver. Reports are not available. Pt recently has multiple labs including CBC, CMP, TSH, CRP, celiac serologies, IBD serologies, fecal elastase and fecal calprotectin. Only abnormality was slightly elevated ALT at 58. Remaining labs were WNL. Chronic pain, anxiety. Diabetic. Has HLD.  Given both Bentyl and colestipol for IBS-D. She did have a right upper quadrant ultrasound on May 4, 2021 for evaluation of right upper quadrant abdominal pain.  The exam was normal.  Gallbladder was normal without stones or wall thickness.  Patient was not tender over the region of the gallbladder.  Once again, findings of fatty liver.  No focal lesion identified.  Right kidney normal as well as common bile duct and visualized portions of the pancreas though limited due to her body habitus.  According to blood work from April 27, 2021, AST 37 and ALT decreased from prior labs in December down to 54 from 75.  Her alkaline phosphatase and total bilirubin have remained normal.  Triglycerides elevated at 391 with normal total cholesterol and LDL.  Around that time, WBC elevated 12.59 with hemoglobin hematocrit elevated and platelet count of 451, likely acute phase reactant.  Hemoglobin A1c elevated at 7.2%.  Hepatitis C antibody negative. Since been diagnosed as diabetic, she has completely revamped her diet and is eating low-carb, no refined sugars or starches.  Drinking significant amounts of water.  Low physical activity due to recent back surgery.  Denies nausea, vomiting or dysphagia.  Her episodes of nausea have improved greatly.  She does follow-up with a pain specialist for pain control.  Believes that due to her change in her diet her IBS symptoms have also improved, not requiring neither daily colestipol nor Bentyl.  She is using a diabetic educator and believes this is helping her significantly, reporting a 50 pound weight loss in the last 4 months.  Good appetite.  No new GI complaints. Recent lab testing with negative HAV and HBV. Labs from September with further decline of ALT, down to 38. Bloodwork this year with improved HbA1c and ALT and AST, ALK Phos and Tbili within normal limits. She continues to lose weight. She has lost 25 pounds in the last 4-5 months. She has no new complaints today.  States that she recently had abnormal heart screening exam and will see a cardiologist tomorrow for this.  Gives a family history of heart disease.  Currently, no chest pain or shortness of breath.  She continues to lose weight trying to modify her diet.  Admits that recently she had been eating a little bit more junk food but plans to fix this and decrease her intake of high sugar and fat foods in the coming weeks.

## 2022-09-28 ENCOUNTER — OFFICE VISIT (OUTPATIENT)
Dept: URBAN - METROPOLITAN AREA CLINIC 40 | Facility: CLINIC | Age: 37
End: 2022-09-28
Payer: COMMERCIAL

## 2022-09-28 VITALS
BODY MASS INDEX: 43.65 KG/M2 | SYSTOLIC BLOOD PRESSURE: 122 MMHG | HEIGHT: 65 IN | WEIGHT: 262 LBS | HEART RATE: 67 BPM | TEMPERATURE: 97.6 F | DIASTOLIC BLOOD PRESSURE: 76 MMHG

## 2022-09-28 DIAGNOSIS — K76.0 FATTY (CHANGE OF) LIVER, NOT ELSEWHERE CLASSIFIED: ICD-10-CM

## 2022-09-28 DIAGNOSIS — E66.9 OBESITY (BMI 30-39.9): ICD-10-CM

## 2022-09-28 DIAGNOSIS — K58.9 IBS (IRRITABLE BOWEL SYNDROME) DIARRHEA PREDOMINANT: ICD-10-CM

## 2022-09-28 DIAGNOSIS — R79.89 ELEVATED LFTS: ICD-10-CM

## 2022-09-28 DIAGNOSIS — K21.9 GERD: ICD-10-CM

## 2022-09-28 PROCEDURE — 99214 OFFICE O/P EST MOD 30 MIN: CPT | Performed by: PHYSICIAN ASSISTANT

## 2022-09-28 RX ORDER — CHLORZOXAZONE 500 MG/1
1 TABLET AS NEEDED TABLET ORAL THREE TIMES A DAY
Status: ACTIVE | COMMUNITY

## 2022-09-28 RX ORDER — GABAPENTIN 300 MG/1
1 CAPSULE CAPSULE ORAL ONCE A DAY
Status: ACTIVE | COMMUNITY

## 2022-09-28 RX ORDER — CLONAZEPAM 0.5 MG/1
1 TABLET AT BEDTIME TABLET ORAL ONCE A DAY
Status: ACTIVE | COMMUNITY

## 2022-09-28 RX ORDER — LOSARTAN POTASSIUM 25 MG/1
1 TABLET TABLET ORAL ONCE A DAY
Status: ACTIVE | COMMUNITY

## 2022-09-28 RX ORDER — SIMVASTATIN 20 MG/1
1 TABLET IN THE EVENING TABLET, FILM COATED ORAL ONCE A DAY
Status: ACTIVE | COMMUNITY

## 2022-09-28 RX ORDER — ZIPRASIDONE HYDROCHLORIDE 80 MG/1
1 CAPSULE WITH FOOD CAPSULE ORAL DAILY
Status: ACTIVE | COMMUNITY

## 2022-09-28 RX ORDER — TRAMADOL HYDROCHLORIDE 50 MG/1
1 TABLET AS NEEDED TABLET, FILM COATED ORAL ONCE A DAY
Status: ACTIVE | COMMUNITY

## 2022-09-28 RX ORDER — ZIPRASIDONE HYDROCHLORIDE 60 MG/1
2 CAPSULES CAPSULE ORAL AT BEDTIME
Status: ACTIVE | COMMUNITY

## 2022-09-28 RX ORDER — CHOLESTYRAMINE 4 G/9G
1 PACKET MIXED WITH WATER OR NON-CARBONATED DRINK POWDER, FOR SUSPENSION ORAL TWICE A DAY
Qty: 60 | Status: ACTIVE | COMMUNITY

## 2022-09-28 RX ORDER — ONDANSETRON HYDROCHLORIDE 4 MG/1
1 TABLET TABLET, FILM COATED ORAL
Qty: 30 | Refills: 0 | Status: ACTIVE | COMMUNITY

## 2022-09-28 RX ORDER — HYDROXYZINE PAMOATE 50 MG/1
1 CAPSULE AS NEEDED CAPSULE ORAL
Status: ACTIVE | COMMUNITY

## 2022-09-28 RX ORDER — METFORMIN ER 500 MG 500 MG/1
1 TABLET WITH EVENING MEAL TABLET ORAL ONCE A DAY
Status: ACTIVE | COMMUNITY

## 2022-09-28 RX ORDER — BUDESONIDE AND FORMOTEROL FUMARATE DIHYDRATE 80; 4.5 UG/1; UG/1
2 PUFFS AEROSOL RESPIRATORY (INHALATION) ONCE A DAY
Status: ACTIVE | COMMUNITY

## 2022-09-28 RX ORDER — BUPROPION HYDROCHLORIDE 300 MG/1
1 TABLET IN THE MORNING TABLET, EXTENDED RELEASE ORAL ONCE A DAY
Status: ACTIVE | COMMUNITY

## 2022-09-28 RX ORDER — MELOXICAM 7.5 MG/1
1 TABLET TABLET ORAL ONCE A DAY
Status: ACTIVE | COMMUNITY

## 2022-09-28 RX ORDER — EMPAGLIFLOZIN 25 MG/1
1 TABLET TABLET, FILM COATED ORAL ONCE A DAY
Status: ACTIVE | COMMUNITY

## 2022-09-28 NOTE — HPI-TODAY'S VISIT:
Ms. Irving is a 37 year old White female who presents to [place] office for evaluation of abnormal liver tests, last visit 3/29/22. Previously seen May 2021 and also by Dr. Thapa 3/15/21 for f/u of nausea and diarrhea .   She has diabetes and morbid obesity. Gastric Emptying study was negative.  She states that she had EGD and colonoscopy 4-5 years ago in [facility] and does not remember results. Reports are not available. Pt reportedly had RUQ US and CT A/P by her PCP which reportedly showed fatty liver. Reports are not available. Pt recently has multiple labs including CBC, CMP, TSH, CRP, celiac serologies, IBD serologies, fecal elastase and fecal calprotectin. Only abnormality was slightly elevated ALT at 58. Remaining labs were WNL. Chronic pain, anxiety. Diabetic. Has HLD.  Given both Bentyl and colestipol for IBS-D. She did have a right upper quadrant ultrasound on May 4, 2021 for evaluation of right upper quadrant abdominal pain.  The exam was normal.  Gallbladder was normal without stones or wall thickness.  Patient was not tender over the region of the gallbladder.  Once again, findings of fatty liver.  No focal lesion identified.  Right kidney normal as well as common bile duct and visualized portions of the pancreas though limited due to her body habitus.  According to blood work from April 27, 2021, AST 37 and ALT decreased from prior labs in December down to 54 from 75.  Her alkaline phosphatase and total bilirubin have remained normal.  Triglycerides elevated at 391 with normal total cholesterol and LDL.  Around that time, WBC elevated 12.59 with hemoglobin hematocrit elevated and platelet count of 451, likely acute phase reactant.  Hemoglobin A1c elevated at 7.2%.  Hepatitis C antibody negative. Since been diagnosed as diabetic, she has completely revamped her diet and is eating low-carb, no refined sugars or starches.  Drinking significant amounts of water.  Low physical activity due to recent back surgery.  Denies nausea, vomiting or dysphagia.  Her episodes of nausea have improved greatly.  She does follow-up with a pain specialist for pain control.  Believes that due to her change in her diet her IBS symptoms have also improved, not requiring neither daily colestipol nor Bentyl.  She is using a diabetic educator and believes this is helping her significantly. Good appetite. Recent lab testing with negative HAV and HBV. Labs from September 2021 with further decline of ALT, down to 38. Bloodwork this year with improved HbA1c and ALT and AST, ALK Phos and Tbili within normal limits.  Recent normal CV workup. She admits to feeling well, no new complaints today.  She continues to work on her diet for weight loss.  Since her last visit, she has lost 19 pounds.  No significant abdominal pain today.

## 2022-09-29 PROBLEM — 235595009 GASTROESOPHAGEAL REFLUX DISEASE: Status: ACTIVE | Noted: 2021-05-20

## 2022-09-29 PROBLEM — 414916001 OBESITY: Status: ACTIVE | Noted: 2021-05-20

## 2022-09-29 PROBLEM — 10743008 IRRITABLE BOWEL SYNDROME: Status: ACTIVE | Noted: 2021-05-20

## 2022-09-29 PROBLEM — 197321007: Status: ACTIVE | Noted: 2021-03-15

## 2022-09-29 LAB
ALBUMIN/GLOBULIN RATIO: 1.8
ALBUMIN: 3.9
ALKALINE PHOSPHATASE: 37
ALT (SGPT): 18
AST (SGOT): 13
BILIRUBIN, DIRECT: 0.1
BILIRUBIN, INDIRECT: 0.3
BILIRUBIN, TOTAL: 0.4
GLOBULIN: 2.2
PROTEIN, TOTAL: 6.1

## 2022-09-30 ENCOUNTER — TELEPHONE ENCOUNTER (OUTPATIENT)
Dept: URBAN - METROPOLITAN AREA CLINIC 40 | Facility: CLINIC | Age: 37
End: 2022-09-30

## 2023-02-18 NOTE — PHYSICAL EXAM GASTROINTESTINAL
Abdomen , soft, obese, nontender, nondistended , no guarding or rigidity , no masses palpable , normal bowel sounds , Liver and Spleen , no hepatomegaly present , no hepatosplenomegaly , liver nontender , spleen not palpable
No

## 2023-03-23 ENCOUNTER — DASHBOARD ENCOUNTERS (OUTPATIENT)
Age: 38
End: 2023-03-23

## 2023-03-23 ENCOUNTER — OFFICE VISIT (OUTPATIENT)
Dept: URBAN - METROPOLITAN AREA CLINIC 40 | Facility: CLINIC | Age: 38
End: 2023-03-23
Payer: COMMERCIAL

## 2023-03-23 ENCOUNTER — LAB OUTSIDE AN ENCOUNTER (OUTPATIENT)
Dept: URBAN - METROPOLITAN AREA CLINIC 40 | Facility: CLINIC | Age: 38
End: 2023-03-23

## 2023-03-23 VITALS
DIASTOLIC BLOOD PRESSURE: 82 MMHG | HEIGHT: 65 IN | HEART RATE: 68 BPM | BODY MASS INDEX: 48.82 KG/M2 | WEIGHT: 293 LBS | SYSTOLIC BLOOD PRESSURE: 136 MMHG

## 2023-03-23 DIAGNOSIS — E66.9 OBESITY (BMI 30-39.9): ICD-10-CM

## 2023-03-23 DIAGNOSIS — K59.01 CONSTIPATION: ICD-10-CM

## 2023-03-23 DIAGNOSIS — R79.89 ELEVATED LFTS: ICD-10-CM

## 2023-03-23 DIAGNOSIS — K76.0 FATTY (CHANGE OF) LIVER, NOT ELSEWHERE CLASSIFIED: ICD-10-CM

## 2023-03-23 PROCEDURE — 99214 OFFICE O/P EST MOD 30 MIN: CPT | Performed by: PHYSICIAN ASSISTANT

## 2023-03-23 RX ORDER — BUDESONIDE AND FORMOTEROL FUMARATE DIHYDRATE 80; 4.5 UG/1; UG/1
2 PUFFS AEROSOL RESPIRATORY (INHALATION) ONCE A DAY
Status: ACTIVE | COMMUNITY

## 2023-03-23 RX ORDER — HYDROXYZINE PAMOATE 50 MG/1
1 CAPSULE AS NEEDED CAPSULE ORAL
Status: ACTIVE | COMMUNITY

## 2023-03-23 RX ORDER — CHOLESTYRAMINE 4 G/9G
1 PACKET MIXED WITH WATER OR NON-CARBONATED DRINK POWDER, FOR SUSPENSION ORAL TWICE A DAY
Qty: 60 | Status: ACTIVE | COMMUNITY

## 2023-03-23 RX ORDER — LOSARTAN POTASSIUM 25 MG/1
1 TABLET TABLET ORAL ONCE A DAY
Status: ACTIVE | COMMUNITY

## 2023-03-23 RX ORDER — ZIPRASIDONE HYDROCHLORIDE 80 MG/1
1 CAPSULE WITH FOOD CAPSULE ORAL DAILY
Status: ACTIVE | COMMUNITY

## 2023-03-23 RX ORDER — METFORMIN ER 500 MG 500 MG/1
1 TABLET WITH EVENING MEAL TABLET ORAL ONCE A DAY
Status: ACTIVE | COMMUNITY

## 2023-03-23 RX ORDER — TRAMADOL HYDROCHLORIDE 50 MG/1
1 TABLET AS NEEDED TABLET, FILM COATED ORAL ONCE A DAY
Status: ACTIVE | COMMUNITY

## 2023-03-23 RX ORDER — CLONAZEPAM 0.5 MG/1
1 TABLET AT BEDTIME TABLET ORAL ONCE A DAY
Status: ACTIVE | COMMUNITY

## 2023-03-23 RX ORDER — ZIPRASIDONE HYDROCHLORIDE 60 MG/1
2 CAPSULES CAPSULE ORAL AT BEDTIME
Status: ACTIVE | COMMUNITY

## 2023-03-23 RX ORDER — MELOXICAM 7.5 MG/1
1 TABLET TABLET ORAL ONCE A DAY
Status: ACTIVE | COMMUNITY

## 2023-03-23 RX ORDER — GABAPENTIN 300 MG/1
1 CAPSULE CAPSULE ORAL ONCE A DAY
Status: ACTIVE | COMMUNITY

## 2023-03-23 RX ORDER — EMPAGLIFLOZIN 25 MG/1
1 TABLET TABLET, FILM COATED ORAL ONCE A DAY
Status: ACTIVE | COMMUNITY

## 2023-03-23 RX ORDER — ONDANSETRON HYDROCHLORIDE 4 MG/1
1 TABLET TABLET, FILM COATED ORAL
Qty: 30 | Refills: 0 | Status: ACTIVE | COMMUNITY

## 2023-03-23 RX ORDER — BUPROPION HYDROCHLORIDE 300 MG/1
1 TABLET IN THE MORNING TABLET, EXTENDED RELEASE ORAL ONCE A DAY
Status: ACTIVE | COMMUNITY

## 2023-03-23 RX ORDER — CHLORZOXAZONE 500 MG/1
1 TABLET AS NEEDED TABLET ORAL THREE TIMES A DAY
Status: ACTIVE | COMMUNITY

## 2023-03-23 RX ORDER — SIMVASTATIN 20 MG/1
1 TABLET IN THE EVENING TABLET, FILM COATED ORAL ONCE A DAY
Status: ACTIVE | COMMUNITY

## 2023-03-23 NOTE — HPI-TODAY'S VISIT:
Ms. Irving is a 37 year old White female who has been followed in our office with history of abdominal pain and for evaluation of abnormal liver tests, last visit 3/29/22. Previously seen May 2021 and also by Dr. Thapa 3/15/21 for f/u of nausea and diarrhea .   She has diabetes and morbid obesity. Gastric Emptying study was negative.  She states that she had EGD and colonoscopy 4-5 years ago in [facility] and does not remember results. Our records reveal an EGD w/ bx in 2015 with gastritis, no Hpylori. SBBx with gastric foveolar metaplasia. No celiac sprue. No IM of gastric mucosa. Reports are not available. Pt reportedly had RUQ US and CT A/P by her PCP which reportedly showed fatty liver. Reports are not available. Pt recently has multiple labs including CBC, CMP, TSH, CRP, celiac serologies, IBD serologies, fecal elastase and fecal calprotectin. Only abnormality was slightly elevated ALT at 58. Remaining labs were WNL. Chronic pain, anxiety. Diabetic. Has HLD.  Given both Bentyl and colestipol for IBS-D. She did have a right upper quadrant ultrasound on May 4, 2021 for evaluation of right upper quadrant abdominal pain.  The exam was normal.  Gallbladder was normal without stones or wall thickness.  Patient was not tender over the region of the gallbladder.  Once again, findings of fatty liver.  No focal lesion identified.  Right kidney normal as well as common bile duct and visualized portions of the pancreas though limited due to her body habitus.  According to blood work from April 27, 2021, AST 37 and ALT decreased from prior labs in December down to 54 from 75.  Her alkaline phosphatase and total bilirubin have remained normal.  Triglycerides elevated at 391 with normal total cholesterol and LDL.  Around that time, WBC elevated 12.59 with hemoglobin hematocrit elevated and platelet count of 451, likely acute phase reactant.  Hemoglobin A1c elevated at 7.2%.  Hepatitis C antibody negative. Since been diagnosed as diabetic, she has completely revamped her diet and is eating low-carb, no refined sugars or starches.  Drinking significant amounts of water.  Low physical activity due to recent back surgery.  Denies nausea, vomiting or dysphagia.  Her episodes of nausea have improved greatly.  She does follow-up with a pain specialist for pain control.  Believes that due to her change in her diet her IBS symptoms have also improved, not requiring neither daily colestipol nor Bentyl.  Good appetite. Recent lab testing with negative HAV and HBV. Labs from September 2021 with further decline of ALT, down to 38. Bloodwork in 2022 with improved HbA1c and ALT and AST, ALK Phos and Tbili within normal limits.  Recent normal CV workup. HFP normal 9/2022. She has gained 40 pounds in the last 6 months. Admits this is due to poor diet, eating a lot of sweets after tobacco cessation. She plans to start working out and has been drinking more water which has helped recent, mild constipation.

## 2023-03-24 LAB
A/G RATIO: 1.6
ALBUMIN: 3.9
ALKALINE PHOSPHATASE: 36
ALT (SGPT): 25
AST (SGOT): 14
BILIRUBIN, TOTAL: 0.2
BUN/CREATININE RATIO: (no result)
BUN: 13
CALCIUM: 8.9
CARBON DIOXIDE, TOTAL: 26
CHLORIDE: 105
CREATININE: 0.89
EGFR: 85
GLOBULIN, TOTAL: 2.4
GLUCOSE: 100
HEMATOCRIT: 45
HEMOGLOBIN: 14.6
MCH: 30.4
MCHC: 32.4
MCV: 93.8
MPV: 9.8
PLATELET COUNT: 351
POTASSIUM: 5
PROTEIN, TOTAL: 6.3
RDW: 13.4
RED BLOOD CELL COUNT: 4.8
SODIUM: 138
TSH W/REFLEX TO FT4: 2.81
WHITE BLOOD CELL COUNT: 8.7

## 2024-05-21 NOTE — PHYSICAL EXAM NEUROLOGIC:
Ibuprofen 600mg every 6-8 hours ago as needed  Tylenol 500mg or 650mg every 4-6 hours as needed   oriented to person, place and time , nonfocal , normal sensation , short and long term memory intact